# Patient Record
Sex: FEMALE | Race: OTHER | URBAN - METROPOLITAN AREA
[De-identification: names, ages, dates, MRNs, and addresses within clinical notes are randomized per-mention and may not be internally consistent; named-entity substitution may affect disease eponyms.]

---

## 2017-12-12 NOTE — PATIENT DISCUSSION
POSTERIOR VITREOUS DETACHMENT, OU: RETURN FOR FOLLOW-UP AS SCHEDULED. D/W PATIENT RD PRECAUTIONS AND TO CALL THE OFFICE WITH CHANGES.

## 2022-06-18 RX ORDER — PHENYTOIN SODIUM 100 MG/1
CAPSULE, EXTENDED RELEASE ORAL
COMMUNITY

## 2022-06-18 RX ORDER — TRIAMCINOLONE ACETONIDE 55 UG/1
SPRAY, METERED NASAL
COMMUNITY

## 2022-06-18 RX ORDER — HYDROXYCHLOROQUINE SULFATE 200 MG/1
TABLET, FILM COATED ORAL
COMMUNITY

## 2022-06-18 RX ORDER — MELOXICAM 7.5 MG/1
TABLET ORAL
COMMUNITY

## 2022-06-18 RX ORDER — NEBIVOLOL 2.5 MG/1
TABLET ORAL
COMMUNITY

## 2022-11-14 ENCOUNTER — NEW PATIENT (OUTPATIENT)
Dept: URBAN - METROPOLITAN AREA CLINIC 4 | Facility: CLINIC | Age: 62
End: 2022-11-14

## 2022-11-14 DIAGNOSIS — H02.726: ICD-10-CM

## 2022-11-14 DIAGNOSIS — Z79.899: ICD-10-CM

## 2022-11-14 DIAGNOSIS — H02.723: ICD-10-CM

## 2022-11-14 DIAGNOSIS — H25.13: ICD-10-CM

## 2022-11-14 PROCEDURE — 99204 OFFICE O/P NEW MOD 45 MIN: CPT

## 2022-11-14 ASSESSMENT — KERATOMETRY
OD_AXISANGLE_DEGREES: 44
OS_K2POWER_DIOPTERS: 39.50
OD_K2POWER_DIOPTERS: 40.25
OS_K1POWER_DIOPTERS: 39.50
OS_AXISANGLE2_DEGREES: 90
OD_K1POWER_DIOPTERS: 39.75
OS_AXISANGLE_DEGREES: 180
OD_AXISANGLE2_DEGREES: 134

## 2022-11-14 ASSESSMENT — VISUAL ACUITY
OD_CC: J1-1
OS_CC: 20/25+1

## 2022-11-14 ASSESSMENT — TONOMETRY
OD_IOP_MMHG: 12
OS_IOP_MMHG: 10

## 2022-12-08 ENCOUNTER — TECH ONLY (OUTPATIENT)
Dept: URBAN - METROPOLITAN AREA CLINIC 4 | Facility: CLINIC | Age: 62
End: 2022-12-08

## 2022-12-08 PROCEDURE — 92083 EXTENDED VISUAL FIELD XM: CPT

## 2022-12-08 ASSESSMENT — KERATOMETRY
OD_AXISANGLE_DEGREES: 44
OS_K2POWER_DIOPTERS: 39.50
OD_K2POWER_DIOPTERS: 40.25
OS_AXISANGLE_DEGREES: 180
OD_K1POWER_DIOPTERS: 39.75
OD_AXISANGLE2_DEGREES: 134
OS_K1POWER_DIOPTERS: 39.50
OS_AXISANGLE2_DEGREES: 90

## 2023-12-07 ENCOUNTER — ESTABLISHED PATIENT (OUTPATIENT)
Facility: LOCATION | Age: 63
End: 2023-12-07

## 2023-12-07 DIAGNOSIS — Z79.899: ICD-10-CM

## 2023-12-07 DIAGNOSIS — H25.13: ICD-10-CM

## 2023-12-07 PROCEDURE — 92083 EXTENDED VISUAL FIELD XM: CPT

## 2023-12-07 PROCEDURE — 92014 COMPRE OPH EXAM EST PT 1/>: CPT

## 2023-12-07 PROCEDURE — 92310A CONTACT LENS 50

## 2023-12-07 PROCEDURE — 92134 CPTRZ OPH DX IMG PST SGM RTA: CPT

## 2023-12-07 ASSESSMENT — VISUAL ACUITY
OS_CC: 20/30
OD_CC: J1

## 2023-12-07 ASSESSMENT — KERATOMETRY
OS_AXISANGLE2_DEGREES: 90
OS_AXISANGLE_DEGREES: 180
OD_AXISANGLE_DEGREES: 44
OD_K2POWER_DIOPTERS: 40.25
OS_K1POWER_DIOPTERS: 39.50
OD_K1POWER_DIOPTERS: 39.75
OS_K2POWER_DIOPTERS: 39.50
OD_AXISANGLE2_DEGREES: 134

## 2023-12-07 ASSESSMENT — TONOMETRY
OS_IOP_MMHG: 14
OD_IOP_MMHG: 16